# Patient Record
Sex: MALE | Race: WHITE | URBAN - METROPOLITAN AREA
[De-identification: names, ages, dates, MRNs, and addresses within clinical notes are randomized per-mention and may not be internally consistent; named-entity substitution may affect disease eponyms.]

---
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
## 2025-02-27 ENCOUNTER — HOSPITAL ENCOUNTER (EMERGENCY)
Facility: CLINIC | Age: 25
Discharge: HOME | End: 2025-02-27
Attending: EMERGENCY MEDICINE | Admitting: EMERGENCY MEDICINE
Payer: COMMERCIAL

## 2025-02-27 VITALS
HEART RATE: 87 BPM | WEIGHT: 185 LBS | DIASTOLIC BLOOD PRESSURE: 75 MMHG | OXYGEN SATURATION: 100 % | RESPIRATION RATE: 16 BRPM | TEMPERATURE: 97.9 F | SYSTOLIC BLOOD PRESSURE: 115 MMHG | BODY MASS INDEX: 25.06 KG/M2 | HEIGHT: 72 IN

## 2025-02-27 DIAGNOSIS — M54.9 BACK PAIN, UNSPECIFIED BACK LOCATION, UNSPECIFIED BACK PAIN LATERALITY, UNSPECIFIED CHRONICITY: ICD-10-CM

## 2025-02-27 DIAGNOSIS — R55 SYNCOPE: ICD-10-CM

## 2025-02-27 LAB
ATRIAL RATE - MUSE: 49 BPM
DIASTOLIC BLOOD PRESSURE - MUSE: NORMAL MMHG
INTERPRETATION ECG - MUSE: NORMAL
P AXIS - MUSE: -5 DEGREES
PR INTERVAL - MUSE: 194 MS
QRS DURATION - MUSE: 100 MS
QT - MUSE: 428 MS
QTC - MUSE: 386 MS
R AXIS - MUSE: 87 DEGREES
SYSTOLIC BLOOD PRESSURE - MUSE: NORMAL MMHG
T AXIS - MUSE: 67 DEGREES
VENTRICULAR RATE- MUSE: 49 BPM

## 2025-02-27 PROCEDURE — 99283 EMERGENCY DEPT VISIT LOW MDM: CPT

## 2025-02-27 PROCEDURE — 93005 ELECTROCARDIOGRAM TRACING: CPT

## 2025-02-27 ASSESSMENT — COLUMBIA-SUICIDE SEVERITY RATING SCALE - C-SSRS
1. IN THE PAST MONTH, HAVE YOU WISHED YOU WERE DEAD OR WISHED YOU COULD GO TO SLEEP AND NOT WAKE UP?: NO
2. HAVE YOU ACTUALLY HAD ANY THOUGHTS OF KILLING YOURSELF IN THE PAST MONTH?: NO
6. HAVE YOU EVER DONE ANYTHING, STARTED TO DO ANYTHING, OR PREPARED TO DO ANYTHING TO END YOUR LIFE?: NO

## 2025-02-27 ASSESSMENT — ACTIVITIES OF DAILY LIVING (ADL): ADLS_ACUITY_SCORE: 42

## 2025-02-27 NOTE — ED TRIAGE NOTES
Patient reports sudden onset sharp back pain when emptying the  last night. Patient states he lowered himself to the ground from the pain. Then moved to the couch and had a syncopal episode witnessed by girlfriend. Patient now endorses low back pain discomfort and tenderness.     Triage Assessment (Adult)       Row Name 02/27/25 0709          Triage Assessment    Airway WDL WDL        Respiratory WDL    Respiratory WDL WDL        Cardiac WDL    Cardiac WDL WDL        Cognitive/Neuro/Behavioral WDL    Cognitive/Neuro/Behavioral WDL WDL

## 2025-02-27 NOTE — ED PROVIDER NOTES
Emergency Department Attending Supervisory Note  2/27/2025  7:22 AM      I evaluated this patient with SHANIQUE Yung.       Briefly, the patient presented with syncopal event that occurred in the setting of acute onset low back pain while bending over to obtain this pressure.  Subsequent fainting and shortly after immediate onset of his pain, no reports of trauma.  His pain in his back is now improved except for when bending over.  No family history of sudden cardiac death or early MI      On my exam:  /75   Pulse 87   Temp 97.9  F (36.6  C)   Resp 16   Ht 1.829 m (6')   Wt 83.9 kg (185 lb)   SpO2 100%   BMI 25.09 kg/m        Workup is notable for:  No orders to display     Labs Ordered and Resulted from Time of ED Arrival to Time of ED Departure - No data to display    ECG results from 02/27/25   EKG 12-lead, tracing only     Value    Systolic Blood Pressure     Diastolic Blood Pressure     Ventricular Rate 49    Atrial Rate 49    ND Interval 194    QRS Duration 100        QTc 386    P Axis -5    R AXIS 87    T Axis 67    Interpretation ECG      Sinus bradycardia with sinus arrhythmia  Otherwise normal ECG  No previous ECGs available  Jr Hernandez MD  [facility]  7:57 AM 02/27/25            In summary, my impression is likely vasovagal syncope given constellation of symptoms and preceded by sudden onset back pain, likely secondary to muscle spasm given occurred when bending over.  No report of CP, SOB, recent travel or palpitations. He declined any medication for his back pain.  Return precautions reviewed, recommended PCP follow-up.      ICD-10-CM    1. Back pain, unspecified back location, unspecified back pain laterality, unspecified chronicity  M54.9       2. Syncope  R55             Disposition:  Discharge     Jr Hernandez MD  [facility]  [facility]    7:22 AM 02/27/25           Jr Hernandez MD  02/27/25  [id number]

## 2025-02-27 NOTE — ED PROVIDER NOTES
Emergency Department Note      History of Present Illness     Chief Complaint   Back Pain      HPI   James Naidu is a 24 year old male who presents to the ED for evaluation of back pain.  Patient reports that yesterday he bent over to load the  and had sharp pain in his back.  He rated an 8/10 in severity.  He subsequently went to the couch and laid down.  Upon doing so he had a syncopal event that was witnessed by his girlfriend.  There is no evidence of tongue biting or incontinence.  Patient does not have a history of seizures.  No chest pain or shortness of breath.  No family history of early/sudden cardiac death.  Patient reports that he felt somewhat fatigued following however was back to baseline shortly after.  Patient now states that he has 2/10 back pain.  No numbness, tingling, history of IV drug use, history of cancer, history of back surgeries, fevers.    Independent Historian   None    Review of External Notes   None    Past Medical History     Medical History and Problem List   Past Medical History:   Diagnosis Date    ADHD (attention deficit hyperactivity disorder)        Medications   Amphetamine-Dextroamphetamine (ADDERALL PO)        Surgical History   No past surgical history on file.    Physical Exam     Patient Vitals for the past 24 hrs:   BP Temp Pulse Resp SpO2 Height Weight   02/27/25 0708 115/75 97.9  F (36.6  C) 87 16 100 % 1.829 m (6') 83.9 kg (185 lb)     Physical Exam  General: Awake, alert, non-toxic.  Head:  Scalp is NC/AT  Eyes:  Conjunctiva normal, PERRL  ENT:  The external nose and ears are normal.   Neck:  Normal range of motion without rigidity.  CV:  Regular rate and rhythm    No pathologic murmur, rubs, or gallops.  Resp:  Breath sounds are clear bilaterally    Non-labored, no retractions or accessory muscle use  Abdomen: Abdomen is soft, no distension, no tenderness, no masses, no CVA ttp  MS:  No lower extremity edema/swelling. No midline cervical, thoracic, or  lumbar tenderness.  No joint swelling, redness ranging well.  Skin:  Warm and dry, No rash or lesions noted.  Neuro:  Alert and oriented. GCS 15. No facial asymmetry. Moves upper extremities normally.  Gait normal    5/5 strength BL to hip flexion/extension    5/5 strength BL to knee flexion/extension    5/5 strength BL to ankle dorsi/plantar flexion    5/5 strength BL to great toe dorsi/plantar flexion    Normal sensation touch throughout BL LE. 2+ patellar/achilles reflexes BL  Psych: Awake. Normal affect.  Cooperative.    Diagnostics     Lab Results   Labs Ordered and Resulted from Time of ED Arrival to Time of ED Departure - No data to display    Imaging   No orders to display     ECG results from 02/27/25   EKG 12-lead, tracing only     Value    Systolic Blood Pressure     Diastolic Blood Pressure     Ventricular Rate 49    Atrial Rate 49    CO Interval 194    QRS Duration 100        QTc 386    P Axis -5    R AXIS 87    T Axis 67    Interpretation ECG      Sinus bradycardia with sinus arrhythmia  Otherwise normal ECG  No previous ECGs available  Confirmed by GENERATED REPORT, COMPUTER ([id number]),  MARKOS ARAIZA ([id number]) on 2/27/2025 7:56:37 AM         Independent Interpretation   None    ED Course      Medications Administered   Medications - No data to display    Procedures   Procedures     Discussion of Management   None    ED Course        Additional Documentation  None    Medical Decision Making / Diagnosis     CMS Diagnoses: None    MIPS       None    MDM   James Naidu is a 24 year old male who presents after an episode of LOC and back pain.  History is consistent with syncope.  No historical or exam findings to suggest seizure etiology.  EKG is not concerning and shows no arrhythmias or evidence to suggest Brugada pattern, prolonged QT, WPW, Right ventricular dysplasia, or hypertrophic cardiomyopathy.   EKG also not suggestive of ischemia.  Cardiac exam normal without murmur. No associated chest  pain, shortness of breath, hypoxia, palpitations, exertional component, or history of CHF and I think ACS, PE, aortic dissection very unlikely.  There is no family history of premature sudden death or cardiac issues.  No thunderclap headache or neurologic findings to suggest stroke or SAH.   From a trauma standpoint there is no evidence of serious injury from the episode at this time. I feel vasovagal syncope is most likely at this time.  Patient able to ambulate without difficulty.  Given above findings, I feel this episode of syncope was benign in nature and I feel patient is safe for discharge home.  The patient did not sustain any trauma and has no midline spinous tenderness.  Therefore x-rays are not necessary due to the low likelihood of fracture or subluxation. The patient has not had fever, chills, IV drug use, saddle anesthesia, or bowel or bladder dysfunction.  No history or symptoms of malignancy and no recent surgical intervention.  There is no clinical evidence of cauda equina syndrome, spinal epidural abscess, discitis/osteomyelitis, cord compression, epidural hematoma, transverse myelitis and do not feel emergent MRI is indicated at this time. No evidence of abdominal pain/tenderness or urinary symptoms and doubt referred pain from AAA, vascular, intra-abdominal, or  cause.  Offered patient medication for his back pain for which she declined.  Instructed the patient to return to the emergency department if any new or worsening symptoms develop.  Follow-up with primary care provider in 2-3 days.      Disposition   The patient was discharged.     Diagnosis     ICD-10-CM    1. Back pain, unspecified back location, unspecified back pain laterality, unspecified chronicity  M54.9       2. Syncope  R55            Discharge Medications   Discharge Medication List as of 2/27/2025  8:12 AM            NASEEM Begum Alexandra, PA-C  02/27/25 1304